# Patient Record
Sex: MALE | Race: WHITE | Employment: OTHER | URBAN - METROPOLITAN AREA
[De-identification: names, ages, dates, MRNs, and addresses within clinical notes are randomized per-mention and may not be internally consistent; named-entity substitution may affect disease eponyms.]

---

## 2017-01-13 ENCOUNTER — OFFICE VISIT (OUTPATIENT)
Dept: HEMATOLOGY/ONCOLOGY | Facility: HOSPITAL | Age: 67
End: 2017-01-13
Attending: INTERNAL MEDICINE
Payer: MEDICARE

## 2017-01-13 VITALS
WEIGHT: 179 LBS | HEIGHT: 71.5 IN | BODY MASS INDEX: 24.51 KG/M2 | SYSTOLIC BLOOD PRESSURE: 146 MMHG | OXYGEN SATURATION: 99 % | DIASTOLIC BLOOD PRESSURE: 64 MMHG | RESPIRATION RATE: 18 BRPM | HEART RATE: 99 BPM | TEMPERATURE: 97 F

## 2017-01-13 DIAGNOSIS — C88.0 WALDENSTROM MACROGLOBULINEMIA (HCC): ICD-10-CM

## 2017-01-13 DIAGNOSIS — C88.0 WALDENSTROM MACROGLOBULINEMIA (HCC): Primary | ICD-10-CM

## 2017-01-13 DIAGNOSIS — D63.0 ANEMIA IN NEOPLASTIC DISEASE: Primary | ICD-10-CM

## 2017-01-13 LAB
ALBUMIN SERPL-MCNC: 3.4 G/DL (ref 3.5–4.8)
ALP LIVER SERPL-CCNC: 130 U/L (ref 45–117)
ALT SERPL-CCNC: 18 U/L (ref 17–63)
AST SERPL-CCNC: 13 U/L (ref 15–41)
BASOPHILS # BLD AUTO: 0.03 X10(3) UL (ref 0–0.1)
BASOPHILS NFR BLD AUTO: 0.2 %
BILIRUB SERPL-MCNC: 0.4 MG/DL (ref 0.1–2)
BUN BLD-MCNC: 42 MG/DL (ref 8–20)
CALCIUM BLD-MCNC: 8.7 MG/DL (ref 8.3–10.3)
CHLORIDE: 107 MMOL/L (ref 101–111)
CO2: 21 MMOL/L (ref 22–32)
CREAT BLD-MCNC: 1.46 MG/DL (ref 0.7–1.3)
EOSINOPHIL # BLD AUTO: 0 X10(3) UL (ref 0–0.3)
EOSINOPHIL NFR BLD AUTO: 0 %
ERYTHROCYTE [DISTWIDTH] IN BLOOD BY AUTOMATED COUNT: 13.7 % (ref 11.5–16)
GLUCOSE BLD-MCNC: 461 MG/DL (ref 70–99)
HCT VFR BLD AUTO: 31.6 % (ref 37–53)
HGB BLD-MCNC: 11.2 G/DL (ref 13–17)
IMMATURE GRANULOCYTE COUNT: 0.08 X10(3) UL (ref 0–1)
IMMATURE GRANULOCYTE RATIO %: 0.5 %
IMMUNOGLOBULIN A: 68.5 MG/DL (ref 70–312)
IMMUNOGLOBULIN G: 1200 MG/DL (ref 791–1643)
IMMUNOGLOBULIN M: 790 MG/DL (ref 43–279)
LYMPHOCYTES # BLD AUTO: 1.35 X10(3) UL (ref 0.9–4)
LYMPHOCYTES NFR BLD AUTO: 8.1 %
M PROTEIN MFR SERPL ELPH: 7.5 G/DL (ref 6.1–8.3)
MCH RBC QN AUTO: 32.1 PG (ref 27–33.2)
MCHC RBC AUTO-ENTMCNC: 35.4 G/DL (ref 31–37)
MCV RBC AUTO: 90.5 FL (ref 80–99)
MONOCYTES # BLD AUTO: 0.26 X10(3) UL (ref 0.1–0.6)
MONOCYTES NFR BLD AUTO: 1.6 %
NEUTROPHIL ABS PRELIM: 14.88 X10 (3) UL (ref 1.3–6.7)
NEUTROPHILS # BLD AUTO: 14.88 X10(3) UL (ref 1.3–6.7)
NEUTROPHILS NFR BLD AUTO: 89.6 %
PLATELET # BLD AUTO: 291 10(3)UL (ref 150–450)
POTASSIUM SERPL-SCNC: 5.3 MMOL/L (ref 3.6–5.1)
RBC # BLD AUTO: 3.49 X10(6)UL (ref 3.8–5.8)
RED CELL DISTRIBUTION WIDTH-SD: 44.8 FL (ref 35.1–46.3)
SODIUM SERPL-SCNC: 136 MMOL/L (ref 136–144)
WBC # BLD AUTO: 16.6 X10(3) UL (ref 4–13)

## 2017-01-13 PROCEDURE — 99214 OFFICE O/P EST MOD 30 MIN: CPT | Performed by: INTERNAL MEDICINE

## 2017-01-13 PROCEDURE — 96374 THER/PROPH/DIAG INJ IV PUSH: CPT

## 2017-01-13 PROCEDURE — 96375 TX/PRO/DX INJ NEW DRUG ADDON: CPT

## 2017-01-13 PROCEDURE — 96415 CHEMO IV INFUSION ADDL HR: CPT

## 2017-01-13 PROCEDURE — 96413 CHEMO IV INFUSION 1 HR: CPT

## 2017-01-13 RX ORDER — DIPHENHYDRAMINE HCL 50 MG
50 CAPSULE ORAL ONCE
Status: COMPLETED | OUTPATIENT
Start: 2017-01-13 | End: 2017-01-13

## 2017-01-13 RX ORDER — MEPERIDINE HYDROCHLORIDE 25 MG/ML
12.5 INJECTION INTRAMUSCULAR; INTRAVENOUS; SUBCUTANEOUS ONCE
Status: COMPLETED | OUTPATIENT
Start: 2017-01-13 | End: 2017-01-13

## 2017-01-13 RX ORDER — ACETAMINOPHEN 325 MG/1
650 TABLET ORAL ONCE
Status: COMPLETED | OUTPATIENT
Start: 2017-01-13 | End: 2017-01-13

## 2017-01-13 RX ORDER — ONDANSETRON 2 MG/ML
8 INJECTION INTRAMUSCULAR; INTRAVENOUS ONCE
Status: COMPLETED | OUTPATIENT
Start: 2017-01-13 | End: 2017-01-13

## 2017-01-13 RX ORDER — DIPHENHYDRAMINE HYDROCHLORIDE 50 MG/ML
25 INJECTION INTRAMUSCULAR; INTRAVENOUS ONCE
Status: COMPLETED | OUTPATIENT
Start: 2017-01-13 | End: 2017-01-13

## 2017-01-13 RX ADMIN — ONDANSETRON 8 MG: 2 INJECTION INTRAMUSCULAR; INTRAVENOUS at 11:02:00

## 2017-01-13 RX ADMIN — MEPERIDINE HYDROCHLORIDE 12.5 MG: 25 INJECTION INTRAMUSCULAR; INTRAVENOUS; SUBCUTANEOUS at 13:55:00

## 2017-01-13 RX ADMIN — ACETAMINOPHEN 650 MG: 325 TABLET ORAL at 10:09:00

## 2017-01-13 RX ADMIN — DIPHENHYDRAMINE HYDROCHLORIDE 25 MG: 50 INJECTION INTRAMUSCULAR; INTRAVENOUS at 14:23:00

## 2017-01-13 RX ADMIN — DIPHENHYDRAMINE HCL 50 MG: 50 MG CAPSULE ORAL at 10:09:00

## 2017-01-13 NOTE — PROGRESS NOTES
Pt is here for MD f/u and maintenance Rituxan. Pt took steroids last night and this am. Appetite and energy level is good. Denies pain at present. No complaints.

## 2017-01-13 NOTE — PROGRESS NOTES
Patient had a blood sugar of 461. He has a insulin pump and he was informed to given insulin per MD Tom    Pt here for Rituxan. Pt had some nausea. Infusion stopped. NS wide open. Vs 142/79, r 20, t96.2, p90.  Zofran given and infusion resumed after 30

## 2017-01-13 NOTE — PROGRESS NOTES
Education Record    Learner:  Patient    Disease / Lina Sanchez    Barriers / Limitations:  None   Comments:    Method:  Discussion   Comments:    General Topics:  Plan of care reviewed   Comments:    Outcome:  Shows understanding   Comments:

## 2017-01-14 NOTE — PROGRESS NOTES
[facility]    PATIENT'S NAME: Lolly Cao   ATTENDING PHYSICIAN: Monisha Nolan M.D.    PATIENT ACCOUNT #: [de-identified] LOCATION: [address] [place] [address] RECORD #: [id number] YOB: 1950   DATE OF SERVICE: 01/13/2017       [facility] GENERAL:  He is a well-developed, well-nourished male, in no acute distress. VITAL SIGNS:  His performance status is 0. His weight is 179 pounds, blood pressure is 146/64, pulse 99, respiratory rate is 20, temperature is 97.4. HEENT:  Unremarkable.   H 16:09:14  t: 01/14/2017 09:57:22  Job [id number]  /    cc: RHONDA Patel MD

## 2017-01-24 ENCOUNTER — APPOINTMENT (OUTPATIENT)
Dept: HEMATOLOGY/ONCOLOGY | Facility: HOSPITAL | Age: 67
End: 2017-01-24
Attending: INTERNAL MEDICINE
Payer: MEDICARE

## 2017-02-09 PROBLEM — M54.5 RIGHT LOW BACK PAIN, UNSPECIFIED CHRONICITY, WITH SCIATICA PRESENCE UNSPECIFIED: Status: ACTIVE | Noted: 2017-02-09

## 2017-02-16 ENCOUNTER — NURSE ONLY (OUTPATIENT)
Dept: HEMATOLOGY/ONCOLOGY | Facility: HOSPITAL | Age: 67
End: 2017-02-16
Attending: INTERNAL MEDICINE
Payer: MEDICARE

## 2017-02-16 DIAGNOSIS — C88.0 WALDENSTROM MACROGLOBULINEMIA (HCC): ICD-10-CM

## 2017-02-16 DIAGNOSIS — E10.29 MICROALBUMINURIA DUE TO TYPE 1 DIABETES MELLITUS (HCC): ICD-10-CM

## 2017-02-16 DIAGNOSIS — Z12.5 SCREENING PSA (PROSTATE SPECIFIC ANTIGEN): ICD-10-CM

## 2017-02-16 DIAGNOSIS — R80.9 MICROALBUMINURIA DUE TO TYPE 1 DIABETES MELLITUS (HCC): ICD-10-CM

## 2017-02-16 LAB
ALBUMIN SERPL-MCNC: 3 G/DL (ref 3.5–4.8)
ALP LIVER SERPL-CCNC: 125 U/L (ref 45–117)
ALT SERPL-CCNC: 19 U/L (ref 17–63)
AST SERPL-CCNC: 15 U/L (ref 15–41)
BASOPHILS # BLD AUTO: 0.04 X10(3) UL (ref 0–0.1)
BASOPHILS NFR BLD AUTO: 0.5 %
BILIRUB SERPL-MCNC: 0.4 MG/DL (ref 0.1–2)
BILIRUB UR QL STRIP.AUTO: NEGATIVE
BUN BLD-MCNC: 19 MG/DL (ref 8–20)
CALCIUM BLD-MCNC: 8.2 MG/DL (ref 8.3–10.3)
CHLORIDE: 108 MMOL/L (ref 101–111)
CLARITY UR REFRACT.AUTO: CLEAR
CO2: 26 MMOL/L (ref 22–32)
COLOR UR AUTO: YELLOW
CREAT BLD-MCNC: 1.22 MG/DL (ref 0.7–1.3)
CREAT UR-SCNC: 71 MG/DL
EOSINOPHIL # BLD AUTO: 0.21 X10(3) UL (ref 0–0.3)
EOSINOPHIL NFR BLD AUTO: 2.8 %
ERYTHROCYTE [DISTWIDTH] IN BLOOD BY AUTOMATED COUNT: 13 % (ref 11.5–16)
GLUCOSE BLD-MCNC: 291 MG/DL (ref 70–99)
GLUCOSE UR STRIP.AUTO-MCNC: NEGATIVE MG/DL
HCT VFR BLD AUTO: 30.7 % (ref 37–53)
HGB BLD-MCNC: 10.6 G/DL (ref 13–17)
HYALINE CASTS #/AREA URNS AUTO: PRESENT /LPF
IMMATURE GRANULOCYTE COUNT: 0.02 X10(3) UL (ref 0–1)
IMMATURE GRANULOCYTE RATIO %: 0.3 %
IMMUNOGLOBULIN A: 51.5 MG/DL (ref 70–312)
IMMUNOGLOBULIN G: 880 MG/DL (ref 791–1643)
IMMUNOGLOBULIN M: 586 MG/DL (ref 43–279)
KETONES UR STRIP.AUTO-MCNC: NEGATIVE MG/DL
LEUKOCYTE ESTERASE UR QL STRIP.AUTO: NEGATIVE
LYMPHOCYTES # BLD AUTO: 2.83 X10(3) UL (ref 0.9–4)
LYMPHOCYTES NFR BLD AUTO: 37.8 %
M PROTEIN MFR SERPL ELPH: 6.7 G/DL (ref 6.1–8.3)
MCH RBC QN AUTO: 31.1 PG (ref 27–33.2)
MCHC RBC AUTO-ENTMCNC: 34.5 G/DL (ref 31–37)
MCV RBC AUTO: 90 FL (ref 80–99)
MICROALBUMIN UR-MCNC: 72.4 MG/DL
MICROALBUMIN/CREAT 24H UR-RTO: 1019.7 UG/MG (ref ?–30)
MONOCYTES # BLD AUTO: 0.8 X10(3) UL (ref 0.1–0.6)
MONOCYTES NFR BLD AUTO: 10.7 %
NEUTROPHIL ABS PRELIM: 3.58 X10 (3) UL (ref 1.3–6.7)
NEUTROPHILS # BLD AUTO: 3.58 X10(3) UL (ref 1.3–6.7)
NEUTROPHILS NFR BLD AUTO: 47.9 %
NITRITE UR QL STRIP.AUTO: NEGATIVE
PH UR STRIP.AUTO: 5 [PH] (ref 4.5–8)
PLATELET # BLD AUTO: 244 10(3)UL (ref 150–450)
POTASSIUM SERPL-SCNC: 4.9 MMOL/L (ref 3.6–5.1)
PROT UR STRIP.AUTO-MCNC: 100 MG/DL
PSA SERPL-MCNC: 0.61 NG/ML (ref 0.01–4)
RBC # BLD AUTO: 3.41 X10(6)UL (ref 3.8–5.8)
RBC UR QL AUTO: NEGATIVE
RED CELL DISTRIBUTION WIDTH-SD: 42.3 FL (ref 35.1–46.3)
SODIUM SERPL-SCNC: 143 MMOL/L (ref 136–144)
SP GR UR STRIP.AUTO: 1.01 (ref 1–1.03)
UROBILINOGEN UR STRIP.AUTO-MCNC: <2 MG/DL
WBC # BLD AUTO: 7.5 X10(3) UL (ref 4–13)

## 2017-02-16 PROCEDURE — 80053 COMPREHEN METABOLIC PANEL: CPT

## 2017-02-16 PROCEDURE — 81001 URINALYSIS AUTO W/SCOPE: CPT

## 2017-02-16 PROCEDURE — 36415 COLL VENOUS BLD VENIPUNCTURE: CPT

## 2017-02-16 PROCEDURE — 82043 UR ALBUMIN QUANTITATIVE: CPT

## 2017-02-16 PROCEDURE — 85025 COMPLETE CBC W/AUTO DIFF WBC: CPT

## 2017-02-16 PROCEDURE — 84153 ASSAY OF PSA TOTAL: CPT

## 2017-02-16 PROCEDURE — 82784 ASSAY IGA/IGD/IGG/IGM EACH: CPT

## 2017-02-16 PROCEDURE — 82570 ASSAY OF URINE CREATININE: CPT

## 2017-02-23 NOTE — PROGRESS NOTES
Quick Note:    Mr James Vernon  Urine test shows protein with an elevated microalbumin ratio  The PSA is normal range  Dr Gtz  ______

## 2017-02-28 ENCOUNTER — SNF/IP PROF CHARGE ONLY (OUTPATIENT)
Dept: HEMATOLOGY/ONCOLOGY | Facility: HOSPITAL | Age: 67
End: 2017-02-28

## 2017-02-28 DIAGNOSIS — C83.00 LYMPHOPLASMACYTOID LYMPHOMA, CLL (HCC): Primary | ICD-10-CM

## 2017-02-28 PROCEDURE — G9678 ONCOLOGY CARE MODEL SERVICE: HCPCS | Performed by: INTERNAL MEDICINE

## 2017-03-31 ENCOUNTER — SNF/IP PROF CHARGE ONLY (OUTPATIENT)
Dept: HEMATOLOGY/ONCOLOGY | Facility: HOSPITAL | Age: 67
End: 2017-03-31

## 2017-03-31 DIAGNOSIS — C88.0 WALDENSTROM MACROGLOBULINEMIA (HCC): Primary | ICD-10-CM

## 2017-03-31 PROCEDURE — G9678 ONCOLOGY CARE MODEL SERVICE: HCPCS | Performed by: INTERNAL MEDICINE

## 2017-04-07 ENCOUNTER — OFFICE VISIT (OUTPATIENT)
Dept: HEMATOLOGY/ONCOLOGY | Facility: HOSPITAL | Age: 67
End: 2017-04-07
Attending: INTERNAL MEDICINE
Payer: MEDICARE

## 2017-04-07 ENCOUNTER — PRIOR ORIGINAL RECORDS (OUTPATIENT)
Dept: OTHER | Age: 67
End: 2017-04-07

## 2017-04-07 VITALS
HEART RATE: 77 BPM | WEIGHT: 176.81 LBS | OXYGEN SATURATION: 98 % | TEMPERATURE: 98 F | HEIGHT: 70.67 IN | BODY MASS INDEX: 24.75 KG/M2 | DIASTOLIC BLOOD PRESSURE: 72 MMHG | RESPIRATION RATE: 18 BRPM | SYSTOLIC BLOOD PRESSURE: 130 MMHG

## 2017-04-07 DIAGNOSIS — C88.0 WALDENSTROM MACROGLOBULINEMIA (HCC): Primary | ICD-10-CM

## 2017-04-07 DIAGNOSIS — C88.0 WALDENSTROM MACROGLOBULINEMIA (HCC): ICD-10-CM

## 2017-04-07 DIAGNOSIS — D63.0 ANEMIA IN NEOPLASTIC DISEASE: Primary | ICD-10-CM

## 2017-04-07 DIAGNOSIS — N18.2 CHRONIC RENAL INSUFFICIENCY, STAGE 2 (MILD): ICD-10-CM

## 2017-04-07 PROCEDURE — 96415 CHEMO IV INFUSION ADDL HR: CPT

## 2017-04-07 PROCEDURE — 99214 OFFICE O/P EST MOD 30 MIN: CPT | Performed by: INTERNAL MEDICINE

## 2017-04-07 PROCEDURE — 96413 CHEMO IV INFUSION 1 HR: CPT

## 2017-04-07 PROCEDURE — 96375 TX/PRO/DX INJ NEW DRUG ADDON: CPT

## 2017-04-07 RX ORDER — MEPERIDINE HYDROCHLORIDE 25 MG/ML
25 INJECTION INTRAMUSCULAR; INTRAVENOUS; SUBCUTANEOUS AS NEEDED
Status: DISCONTINUED | OUTPATIENT
Start: 2017-04-07 | End: 2017-04-07

## 2017-04-07 RX ORDER — DIPHENHYDRAMINE HCL 50 MG
50 CAPSULE ORAL ONCE
Status: COMPLETED | OUTPATIENT
Start: 2017-04-07 | End: 2017-04-07

## 2017-04-07 RX ORDER — ACETAMINOPHEN 325 MG/1
650 TABLET ORAL ONCE
Status: COMPLETED | OUTPATIENT
Start: 2017-04-07 | End: 2017-04-07

## 2017-04-07 RX ORDER — ONDANSETRON 2 MG/ML
8 INJECTION INTRAMUSCULAR; INTRAVENOUS ONCE
Status: COMPLETED | OUTPATIENT
Start: 2017-04-07 | End: 2017-04-07

## 2017-04-07 RX ADMIN — DIPHENHYDRAMINE HCL 50 MG: 50 MG CAPSULE ORAL at 09:30:00

## 2017-04-07 RX ADMIN — ACETAMINOPHEN 650 MG: 325 TABLET ORAL at 09:30:00

## 2017-04-07 RX ADMIN — ONDANSETRON 8 MG: 2 INJECTION INTRAMUSCULAR; INTRAVENOUS at 10:35:00

## 2017-04-07 NOTE — PROGRESS NOTES
Pt here for 3 month MD f/u and due for maintenance Rituxan. Pt states he is eating/drinking well and getting enough sleep at night. Reports CARMONA and joint pain along with intermittent numbness to his feet. Concerned about anemia and elevated glucose.

## 2017-04-07 NOTE — PROGRESS NOTES
Pt arrived for rituxan infusion, pt states has no new adverse S/S, pt states has reactions every time he receives treatment, pt alert and appears in nad, pt unsure but this may be his last rituxan infusion, pt alert and appears in nad, pt ambulates with st

## 2017-04-10 NOTE — PROGRESS NOTES
[facility]    PATIENT'S NAME: Sandra Fernandez   ATTENDING PHYSICIAN: Diane Crain M.D.    PATIENT ACCOUNT #: [de-identified] LOCATION: [address] [place] [address] RECORD #: [id number] YOB: 1950   DATE OF SERVICE: 04/07/2017       [facility] insulin pump, pravastatin, quinapril, tramadol, and zinc acetate. PHYSICAL EXAMINATION:    GENERAL:  He is a well-developed, well-nourished male in no acute distress. VITAL SIGNS:  His performance status is 0.   His weight is 176 pounds, which is Kellen MD Abhishke Ray M.D.

## 2017-04-22 PROBLEM — E10.65 UNCONTROLLED TYPE 1 DIABETES MELLITUS WITH HYPERGLYCEMIA (HCC): Status: ACTIVE | Noted: 2017-04-22

## 2017-04-22 PROBLEM — N18.30 UNCONTROLLED TYPE 1 DIABETES MELLITUS WITH STAGE 3 CHRONIC KIDNEY DISEASE (HCC): Status: ACTIVE | Noted: 2017-04-22

## 2017-04-22 PROBLEM — E10.22 UNCONTROLLED TYPE 1 DIABETES MELLITUS WITH STAGE 3 CHRONIC KIDNEY DISEASE (HCC): Status: ACTIVE | Noted: 2017-04-22

## 2017-04-22 PROBLEM — E10.65 UNCONTROLLED TYPE 1 DIABETES MELLITUS WITH BACKGROUND RETINOPATHY (HCC): Status: ACTIVE | Noted: 2017-04-22

## 2017-04-22 PROBLEM — E10.65 UNCONTROLLED TYPE 1 DIABETES MELLITUS WITH STAGE 3 CHRONIC KIDNEY DISEASE (HCC): Status: ACTIVE | Noted: 2017-04-22

## 2017-04-22 PROBLEM — E10.65 UNCONTROLLED TYPE 1 DIABETES MELLITUS WITH CIRCULATORY COMPLICATION, WITH LONG-TERM CURRENT USE OF INSULIN (HCC): Status: ACTIVE | Noted: 2017-04-22

## 2017-04-22 PROBLEM — E10.59 UNCONTROLLED TYPE 1 DIABETES MELLITUS WITH CIRCULATORY COMPLICATION, WITH LONG-TERM CURRENT USE OF INSULIN (HCC): Status: ACTIVE | Noted: 2017-04-22

## 2017-04-22 PROBLEM — E10.3219 UNCONTROLLED TYPE 1 DIABETES MELLITUS WITH BACKGROUND RETINOPATHY (HCC): Status: ACTIVE | Noted: 2017-04-22

## 2017-04-22 PROBLEM — E10.21 UNCONTROLLED TYPE 1 DIABETES MELLITUS WITH DIABETIC NEPHROPATHY, WITH LONG-TERM CURRENT USE OF INSULIN (HCC): Status: ACTIVE | Noted: 2017-04-22

## 2017-04-22 PROBLEM — E10.65 UNCONTROLLED TYPE 1 DIABETES MELLITUS WITH DIABETIC NEPHROPATHY, WITH LONG-TERM CURRENT USE OF INSULIN (HCC): Status: ACTIVE | Noted: 2017-04-22

## 2017-04-30 ENCOUNTER — SNF/IP PROF CHARGE ONLY (OUTPATIENT)
Dept: HEMATOLOGY/ONCOLOGY | Facility: HOSPITAL | Age: 67
End: 2017-04-30

## 2017-04-30 DIAGNOSIS — C83.00 LYMPHOPLASMACYTOID LYMPHOMA, CLL (HCC): Primary | ICD-10-CM

## 2017-04-30 PROCEDURE — G9678 ONCOLOGY CARE MODEL SERVICE: HCPCS | Performed by: INTERNAL MEDICINE

## 2017-05-09 ENCOUNTER — PRIOR ORIGINAL RECORDS (OUTPATIENT)
Dept: OTHER | Age: 67
End: 2017-05-09

## 2017-05-09 ENCOUNTER — MYAURORA ACCOUNT LINK (OUTPATIENT)
Dept: OTHER | Age: 67
End: 2017-05-09

## 2017-05-15 LAB
ALBUMIN: 3.4 G/DL
ALKALINE PHOSPHATATE(ALK PHOS): 128 IU/L
BILIRUBIN TOTAL: 0.4 MG/DL
BUN: 39 MG/DL
CALCIUM: 9.2 MG/DL
CHLORIDE: 103 MEQ/L
CREATININE, SERUM: 1.48 MG/DL
GLUCOSE: 308 MG/DL
HEMATOCRIT: 32.8 %
HEMOGLOBIN: 11.4 G/DL
PLATELETS: 272 K/UL
POTASSIUM, SERUM: 4.6 MEQ/L
PROTEIN, TOTAL: 7.3 G/DL
RED BLOOD COUNT: 3.66 X 10-6/U
SGOT (AST): 13 IU/L
SGPT (ALT): 21 IU/L
SODIUM: 134 MEQ/L
WHITE BLOOD COUNT: 17.7 X 10-3/U

## 2017-05-18 ENCOUNTER — MYAURORA ACCOUNT LINK (OUTPATIENT)
Dept: OTHER | Age: 67
End: 2017-05-18

## 2017-05-18 ENCOUNTER — HOSPITAL ENCOUNTER (OUTPATIENT)
Dept: CARDIOLOGY CLINIC | Facility: HOSPITAL | Age: 67
Discharge: HOME | End: 2017-05-18
Attending: INTERNAL MEDICINE

## 2017-05-18 DIAGNOSIS — I65.23 BILATERAL CAROTID ARTERY STENOSIS: ICD-10-CM

## 2017-05-22 ENCOUNTER — LAB ENCOUNTER (OUTPATIENT)
Dept: LAB | Facility: HOSPITAL | Age: 67
End: 2017-05-22
Attending: INTERNAL MEDICINE
Payer: MEDICARE

## 2017-05-22 ENCOUNTER — PRIOR ORIGINAL RECORDS (OUTPATIENT)
Dept: OTHER | Age: 67
End: 2017-05-22

## 2017-05-22 DIAGNOSIS — E78.00 PURE HYPERCHOLESTEROLEMIA: Primary | ICD-10-CM

## 2017-05-22 PROCEDURE — 80061 LIPID PANEL: CPT

## 2017-05-22 PROCEDURE — 84450 TRANSFERASE (AST) (SGOT): CPT

## 2017-05-22 PROCEDURE — 36415 COLL VENOUS BLD VENIPUNCTURE: CPT

## 2017-05-22 PROCEDURE — 84460 ALANINE AMINO (ALT) (SGPT): CPT

## 2017-05-23 ENCOUNTER — PRIOR ORIGINAL RECORDS (OUTPATIENT)
Dept: OTHER | Age: 67
End: 2017-05-23

## 2017-05-26 LAB
ALT (SGPT): 19 U/L
AST (SGOT): 15 U/L
CHOLESTEROL, TOTAL: 98 MG/DL
HDL CHOLESTEROL: 39 MG/DL
LDL CHOLESTEROL: 48 MG/DL
TRIGLYCERIDES: 56 MG/DL

## 2017-05-31 ENCOUNTER — SNF/IP PROF CHARGE ONLY (OUTPATIENT)
Dept: HEMATOLOGY/ONCOLOGY | Facility: HOSPITAL | Age: 67
End: 2017-05-31

## 2017-05-31 DIAGNOSIS — C83.00 LYMPHOPLASMACYTOID LYMPHOMA, CLL (HCC): Primary | ICD-10-CM

## 2017-05-31 PROCEDURE — G9678 ONCOLOGY CARE MODEL SERVICE: HCPCS | Performed by: INTERNAL MEDICINE

## 2017-06-30 ENCOUNTER — SNF/IP PROF CHARGE ONLY (OUTPATIENT)
Dept: HEMATOLOGY/ONCOLOGY | Facility: HOSPITAL | Age: 67
End: 2017-06-30

## 2017-06-30 DIAGNOSIS — C88.0 WALDENSTROM MACROGLOBULINEMIA (HCC): ICD-10-CM

## 2017-06-30 DIAGNOSIS — C83.00 LYMPHOPLASMACYTOID LYMPHOMA, CLL (HCC): ICD-10-CM

## 2017-06-30 PROCEDURE — G9678 ONCOLOGY CARE MODEL SERVICE: HCPCS | Performed by: INTERNAL MEDICINE

## 2017-07-07 ENCOUNTER — APPOINTMENT (OUTPATIENT)
Dept: HEMATOLOGY/ONCOLOGY | Facility: HOSPITAL | Age: 67
End: 2017-07-07
Attending: INTERNAL MEDICINE
Payer: MEDICARE

## 2017-07-28 ENCOUNTER — OFFICE VISIT (OUTPATIENT)
Dept: HEMATOLOGY/ONCOLOGY | Facility: HOSPITAL | Age: 67
End: 2017-07-28
Attending: INTERNAL MEDICINE
Payer: MEDICARE

## 2017-07-28 VITALS
HEART RATE: 69 BPM | SYSTOLIC BLOOD PRESSURE: 137 MMHG | RESPIRATION RATE: 18 BRPM | DIASTOLIC BLOOD PRESSURE: 56 MMHG | HEIGHT: 70.67 IN | TEMPERATURE: 96 F | OXYGEN SATURATION: 98 % | WEIGHT: 174 LBS | BODY MASS INDEX: 24.36 KG/M2

## 2017-07-28 DIAGNOSIS — E10.22 UNCONTROLLED TYPE 1 DIABETES MELLITUS WITH STAGE 3 CHRONIC KIDNEY DISEASE (HCC): ICD-10-CM

## 2017-07-28 DIAGNOSIS — E10.3219 UNCONTROLLED TYPE 1 DIABETES MELLITUS WITH BACKGROUND RETINOPATHY (HCC): ICD-10-CM

## 2017-07-28 DIAGNOSIS — E10.65 UNCONTROLLED TYPE 1 DIABETES MELLITUS WITH HYPERGLYCEMIA (HCC): ICD-10-CM

## 2017-07-28 DIAGNOSIS — E10.65 TYPE 1 DIABETES, UNCONTROLLED, WITH NEUROPATHY (HCC): ICD-10-CM

## 2017-07-28 DIAGNOSIS — E10.59 UNCONTROLLED TYPE 1 DIABETES MELLITUS WITH CIRCULATORY COMPLICATION, WITH LONG-TERM CURRENT USE OF INSULIN (HCC): ICD-10-CM

## 2017-07-28 DIAGNOSIS — E10.65 UNCONTROLLED TYPE 1 DIABETES MELLITUS WITH CIRCULATORY COMPLICATION, WITH LONG-TERM CURRENT USE OF INSULIN (HCC): ICD-10-CM

## 2017-07-28 DIAGNOSIS — E10.21 UNCONTROLLED TYPE 1 DIABETES MELLITUS WITH DIABETIC NEPHROPATHY, WITH LONG-TERM CURRENT USE OF INSULIN (HCC): ICD-10-CM

## 2017-07-28 DIAGNOSIS — E10.40 TYPE 1 DIABETES, UNCONTROLLED, WITH NEUROPATHY (HCC): ICD-10-CM

## 2017-07-28 DIAGNOSIS — E10.65 UNCONTROLLED TYPE 1 DIABETES MELLITUS WITH STAGE 3 CHRONIC KIDNEY DISEASE (HCC): ICD-10-CM

## 2017-07-28 DIAGNOSIS — C88.0 WALDENSTROM MACROGLOBULINEMIA (HCC): Primary | ICD-10-CM

## 2017-07-28 DIAGNOSIS — D63.0 ANEMIA IN NEOPLASTIC DISEASE: ICD-10-CM

## 2017-07-28 DIAGNOSIS — E10.65 UNCONTROLLED TYPE 1 DIABETES MELLITUS WITH BACKGROUND RETINOPATHY (HCC): ICD-10-CM

## 2017-07-28 DIAGNOSIS — N18.30 UNCONTROLLED TYPE 1 DIABETES MELLITUS WITH STAGE 3 CHRONIC KIDNEY DISEASE (HCC): ICD-10-CM

## 2017-07-28 DIAGNOSIS — N18.2 CHRONIC RENAL INSUFFICIENCY, STAGE 2 (MILD): ICD-10-CM

## 2017-07-28 DIAGNOSIS — E10.65 UNCONTROLLED TYPE 1 DIABETES MELLITUS WITH DIABETIC NEPHROPATHY, WITH LONG-TERM CURRENT USE OF INSULIN (HCC): ICD-10-CM

## 2017-07-28 LAB
ALBUMIN SERPL-MCNC: 3.4 G/DL (ref 3.5–4.8)
ALP LIVER SERPL-CCNC: 119 U/L (ref 45–117)
ALT SERPL-CCNC: 22 U/L (ref 17–63)
AST SERPL-CCNC: 21 U/L (ref 15–41)
BASOPHILS # BLD AUTO: 0.01 X10(3) UL (ref 0–0.1)
BASOPHILS NFR BLD AUTO: 0.1 %
BILIRUB SERPL-MCNC: 0.4 MG/DL (ref 0.1–2)
BUN BLD-MCNC: 43 MG/DL (ref 8–20)
CALCIUM BLD-MCNC: 8.7 MG/DL (ref 8.3–10.3)
CHLORIDE: 115 MMOL/L (ref 101–111)
CO2: 14 MMOL/L (ref 22–32)
CREAT BLD-MCNC: 1.48 MG/DL (ref 0.7–1.3)
EOSINOPHIL # BLD AUTO: 0 X10(3) UL (ref 0–0.3)
EOSINOPHIL NFR BLD AUTO: 0 %
ERYTHROCYTE [DISTWIDTH] IN BLOOD BY AUTOMATED COUNT: 13.4 % (ref 11.5–16)
EST. AVERAGE GLUCOSE BLD GHB EST-MCNC: 154 MG/DL (ref 68–126)
GLUCOSE BLD-MCNC: 243 MG/DL (ref 70–99)
HBA1C MFR BLD HPLC: 7 % (ref ?–5.7)
HCT VFR BLD AUTO: 31.1 % (ref 37–53)
HGB BLD-MCNC: 10.5 G/DL (ref 13–17)
IMMATURE GRANULOCYTE COUNT: 0.03 X10(3) UL (ref 0–1)
IMMATURE GRANULOCYTE RATIO %: 0.4 %
IMMUNOGLOBULIN A: 52.9 MG/DL (ref 70–312)
IMMUNOGLOBULIN G: 885 MG/DL (ref 791–1643)
IMMUNOGLOBULIN M: 627 MG/DL (ref 43–279)
LYMPHOCYTES # BLD AUTO: 1.26 X10(3) UL (ref 0.9–4)
LYMPHOCYTES NFR BLD AUTO: 18.4 %
M PROTEIN MFR SERPL ELPH: 7.2 G/DL (ref 6.1–8.3)
MCH RBC QN AUTO: 30.1 PG (ref 27–33.2)
MCHC RBC AUTO-ENTMCNC: 33.8 G/DL (ref 31–37)
MCV RBC AUTO: 89.1 FL (ref 80–99)
MONOCYTES # BLD AUTO: 0.08 X10(3) UL (ref 0.1–0.6)
MONOCYTES NFR BLD AUTO: 1.2 %
NEUTROPHIL ABS PRELIM: 5.45 X10 (3) UL (ref 1.3–6.7)
NEUTROPHILS # BLD AUTO: 5.45 X10(3) UL (ref 1.3–6.7)
NEUTROPHILS NFR BLD AUTO: 79.9 %
PLATELET # BLD AUTO: 261 10(3)UL (ref 150–450)
POTASSIUM SERPL-SCNC: 5.1 MMOL/L (ref 3.6–5.1)
RBC # BLD AUTO: 3.49 X10(6)UL (ref 3.8–5.8)
RED CELL DISTRIBUTION WIDTH-SD: 43.8 FL (ref 35.1–46.3)
SODIUM SERPL-SCNC: 139 MMOL/L (ref 136–144)
WBC # BLD AUTO: 6.8 X10(3) UL (ref 4–13)

## 2017-07-28 PROCEDURE — 99214 OFFICE O/P EST MOD 30 MIN: CPT | Performed by: INTERNAL MEDICINE

## 2017-07-28 NOTE — PROGRESS NOTES
Pt here for 3 month MD f/u and due for Rituxan. Energy level and appetite has been good. Pt notes some joint pains, not limiting. Pt has no further complaints.      Education Record    Learner:  Patient    Disease / Diagnosis:    Barriers / Limitations:  No

## 2017-07-28 NOTE — PROGRESS NOTES
Pt not receiving treatment today, [place] and no chemo do to numerous former reactions to rituxan

## 2017-07-31 ENCOUNTER — SNF/IP PROF CHARGE ONLY (OUTPATIENT)
Dept: HEMATOLOGY/ONCOLOGY | Facility: HOSPITAL | Age: 67
End: 2017-07-31

## 2017-07-31 DIAGNOSIS — C83.00 LYMPHOPLASMACYTOID LYMPHOMA, CLL (HCC): ICD-10-CM

## 2017-07-31 PROCEDURE — G9678 ONCOLOGY CARE MODEL SERVICE: HCPCS | Performed by: INTERNAL MEDICINE

## 2017-07-31 NOTE — PROGRESS NOTES
[facility]    PATIENT'S NAME: Mickey Favorite   ATTENDING PHYSICIAN: Tai Poon M.D.    PATIENT ACCOUNT #: [de-identified] LOCATION: [address] [place] [address] RECORD #: [id number] YOB: 1950   DATE OF SERVICE: 07/28/2017       [facility] states that he is not quite as energetic as he was before, but he has worked up to 12 to 14 hours a day packing his house, and other than being a little slower at the end of the day, he has generally done well.   His current medications include ascorbic aci worsening infusion-related reactions, that he needed to be treated with anti-CD20 antibody in the future. We will probably try obinutuzumab.   I also talked to him about the fact there are multiple new therapies available now for Waldenstrom's, including t

## 2019-03-01 VITALS
WEIGHT: 179 LBS | HEIGHT: 71 IN | SYSTOLIC BLOOD PRESSURE: 102 MMHG | HEART RATE: 64 BPM | DIASTOLIC BLOOD PRESSURE: 54 MMHG | BODY MASS INDEX: 25.06 KG/M2

## 2021-01-29 DIAGNOSIS — Z23 NEED FOR VACCINATION: ICD-10-CM

## (undated) NOTE — MR AVS SNAPSHOT
After Visit Summary   2/16/2017    Farrukh Kolb    MRN: [id number]           Diagnoses this Visit     Waldenstrom macroglobulinemia ([facility]         Microalbuminuria due to type 1 diabetes mellitus (HCC)         Screening PSA (prostate specific antige If you've recently had a stay at the Hospital you can access your discharge instructions in [place] by going to Visits < Admission Summaries.  If you've been to the Emergency Department or your doctor's office, you can view your past visit information in My

## (undated) NOTE — MR AVS SNAPSHOT
After Visit Summary   1/13/2017    Farrukh Kolb    MRN: [id number]           Diagnoses this Visit     [facility]strom macroglobulinemia ([facility]    -  Primary       Allergies     No Known Allergies      Your Vital Signs Were     Smoking Status

## (undated) NOTE — MR AVS SNAPSHOT
After Visit Summary   4/7/2017    Kalyan Schwab    MRN: [id number]           Diagnoses this Visit     Anemia in neoplastic disease    -  Primary     Waldenstrom macroglobulinemia (HCC)         Chronic renal insufficiency, stage 2 (mild)           Al ASPIRIN 325 MG OR TABS Take 1 tablet daily    CALCIUM & MAGNESIUM CARBONATES OR Take 1 tablet daily    ZINC ACETATE Combined with Calcium/Magnesium                Patient Instructions     None      Please Note     If a lab draw is part of your appointment Please view results for these tests on the individual orders.          Result Summary for COMP METABOLIC PANEL (14)      Component Results     Component Value Flag Ref Range Units Status    Glucose 308 (H) 70-99  mg/dL Final    BUN 39 (H) 8-20  mg/dL Final Basophil Absolute 0.03  0.00-0.10  x10(3) uL Final    Immature Granulocyte Absolute 0.14  0.00-1.00  x10(3) uL Final    Neutrophil % 86.7   % Final    Lymphocyte % 9.3   % Final    Monocyte % 3.0   % Final    Eosinophil % 0.0   % Final    Basophil % 0.2

## (undated) NOTE — MR AVS SNAPSHOT
After Visit Summary   4/7/2017    Divya Garcia    MRN: [id number]           Diagnoses this Visit     Waldenstrom macroglobulinemia ([facility]    -  Primary       Allergies     No Known Allergies      Your Vital Signs Were     Smoking Status Call [phone] for help. [employer] is NOT to be used for urgent needs. For medical emergencies, dial 911.

## (undated) NOTE — MR AVS SNAPSHOT
After Visit Summary   1/13/2017    Alexisie Holiday    MRN: [id number]           Diagnoses this Visit     Anemia in neoplastic disease    -  Primary     Waldenstrom macroglobulinemia ([facility]           Allergies     No Known Allergies      Your Vital Sign Patient Instructions     None      Please Note     If a lab draw is part of your appointment, please arrive 15 minutes early.       To Do List     Friday January 13, 2017     [facility]:  CBC W/ DIFFERENTIAL        Friday February 10, 2017     LAB:  CB Creatinine 1.46 (H) 0.70-1.30  mg/dL Final    GFR 49 (L) >=60   Final    Comment:       Estimated GFR units: mL/min/1.73 square meters   eGFR calculated by the CKD-EPI equation.         Calcium, Total 8.7  8.3-10.3  mg/dL Final    Comment:       Total Calc Immature Granulocyte % 0.5   % Final               MyChart     Visit MyChart  You can access your MyChart to more actively manage your health care and view more details from this visit by going to [email or web address].   If you've recently had a st